# Patient Record
Sex: MALE | Race: BLACK OR AFRICAN AMERICAN | Employment: FULL TIME | URBAN - METROPOLITAN AREA
[De-identification: names, ages, dates, MRNs, and addresses within clinical notes are randomized per-mention and may not be internally consistent; named-entity substitution may affect disease eponyms.]

---

## 2017-03-23 ENCOUNTER — HOSPITAL ENCOUNTER (EMERGENCY)
Age: 27
Discharge: HOME | End: 2017-03-23
Attending: EMERGENCY MEDICINE
Payer: SELF-PAY

## 2017-03-23 VITALS
WEIGHT: 245 LBS | RESPIRATION RATE: 18 BRPM | HEART RATE: 99 BPM | TEMPERATURE: 98.3 F | BODY MASS INDEX: 31.44 KG/M2 | DIASTOLIC BLOOD PRESSURE: 88 MMHG | SYSTOLIC BLOOD PRESSURE: 149 MMHG | HEIGHT: 74 IN | OXYGEN SATURATION: 99 %

## 2017-03-23 DIAGNOSIS — J06.9 ACUTE UPPER RESPIRATORY INFECTION: Primary | ICD-10-CM

## 2017-03-23 PROCEDURE — 99282 EMERGENCY DEPT VISIT SF MDM: CPT

## 2017-03-23 RX ORDER — IBUPROFEN 600 MG/1
600 TABLET ORAL
Qty: 20 TAB | Refills: 0 | Status: SHIPPED | OUTPATIENT
Start: 2017-03-23

## 2017-03-23 NOTE — DISCHARGE INSTRUCTIONS

## 2017-03-23 NOTE — ED NOTES
Emergency Department Nursing Plan of Care       The Nursing Plan of Care is developed from the Nursing assessment and Emergency Department Attending provider initial evaluation. The plan of care may be reviewed in the ED Provider note.     The Plan of Care was developed with the following considerations:   Patient / Family readiness to learn indicated by:verbalized understanding  Persons(s) to be included in education: patient  Barriers to Learning/Limitations:No    Signed     Cecile Elder RN    3/23/2017   11:33 AM

## 2017-03-23 NOTE — ED PROVIDER NOTES
Patient is a 27 y.o. male presenting with nasal congestion and cough. The history is provided by the patient. Nasal Congestion   This is a new problem. Episode onset: Pt reports nasal congestion, productive cough, decreased appetite and mylagias x 5 days. Subjective fever. Denies sore throat, ear pain, sick contacts, N/V. Pt states he is still drinking fluids regularly. Pertinent negatives include no chest pain, no abdominal pain, no headaches and no shortness of breath. Treatments tried: OTC Theraflu. The treatment provided no relief. Cough   This is a new problem. Episode onset: x 5 days. The cough is productive of sputum. Patient reports a subjective fever - was not measured. Pertinent negatives include no chest pain, no chills, no sweats, no eye redness, no ear congestion, no ear pain, no headaches, no rhinorrhea, no sore throat, no myalgias, no shortness of breath, no nausea and no vomiting. His past medical history does not include asthma. No past medical history on file. No past surgical history on file. No family history on file. Social History     Social History    Marital status: N/A     Spouse name: N/A    Number of children: N/A    Years of education: N/A     Occupational History    Not on file. Social History Main Topics    Smoking status: Not on file    Smokeless tobacco: Not on file    Alcohol use Not on file    Drug use: Not on file    Sexual activity: Not on file     Other Topics Concern    Not on file     Social History Narrative         ALLERGIES: Review of patient's allergies indicates no known allergies. Review of Systems   Constitutional: Negative for chills and fever. HENT: Positive for congestion. Negative for ear discharge, ear pain, facial swelling, postnasal drip, rhinorrhea, sore throat and trouble swallowing. Eyes: Negative for photophobia, redness and visual disturbance. Respiratory: Positive for cough.  Negative for chest tightness and shortness of breath. Cardiovascular: Negative for chest pain. Gastrointestinal: Negative for abdominal pain, nausea and vomiting. Genitourinary: Negative for flank pain. Musculoskeletal: Negative for back pain and myalgias. Skin: Negative for color change, pallor, rash and wound. Neurological: Negative for dizziness, weakness, light-headedness and headaches. All other systems reviewed and are negative. Vitals:    03/23/17 1105   BP: 149/88   Pulse: 99   Resp: 18   Temp: 98.3 °F (36.8 °C)   SpO2: 99%   Weight: 111.1 kg (245 lb)   Height: 6' 2\" (1.88 m)            Physical Exam   Constitutional: He is oriented to person, place, and time. He appears well-developed and well-nourished. No distress. HENT:   Head: Normocephalic and atraumatic. Right Ear: Tympanic membrane and external ear normal.   Left Ear: Tympanic membrane, external ear and ear canal normal.   Nose: Mucosal edema present. No rhinorrhea. Right sinus exhibits no maxillary sinus tenderness and no frontal sinus tenderness. Left sinus exhibits no maxillary sinus tenderness and no frontal sinus tenderness. Mouth/Throat: Uvula is midline, oropharynx is clear and moist and mucous membranes are normal. No trismus in the jaw. No uvula swelling. No oropharyngeal exudate, posterior oropharyngeal edema, posterior oropharyngeal erythema or tonsillar abscesses. Eyes: Conjunctivae are normal.   Cardiovascular: Normal rate, regular rhythm and normal heart sounds. Pulmonary/Chest: Effort normal and breath sounds normal. No respiratory distress. Abdominal: Soft. Bowel sounds are normal. There is no tenderness. Musculoskeletal: Normal range of motion. Neurological: He is alert and oriented to person, place, and time. No cranial nerve deficit. Skin: Skin is warm. No rash noted. Psychiatric: He has a normal mood and affect. His behavior is normal.   Nursing note and vitals reviewed.        MDM  Number of Diagnoses or Management Options  Diagnosis management comments: DDx: URI, Tonsillitis/Pharyngitis, Sinusitis    ED Course       Procedures           LABORATORY TESTS:  No results found for this or any previous visit (from the past 12 hour(s)). IMAGING RESULTS:  No orders to display       MEDICATIONS GIVEN:  Medications - No data to display    IMPRESSION:  1. Acute upper respiratory infection        PLAN:  1. Current Discharge Medication List      START taking these medications    Details   ibuprofen (MOTRIN) 600 mg tablet Take 1 Tab by mouth every six (6) hours as needed for Pain. Qty: 20 Tab, Refills: 0      Phenylephrine-DM-guaiFENesin (MUCINEX FAST-MAX CONGEST-COUGH) 2.5-5-100 mg/5 mL liqd Take 10 mg by mouth two (2) times a day. Qty: 118 mL, Refills: 0           2.    Follow-up Information     Follow up With Details Comments 2021 Katiuska Kidd Schedule an appointment as soon as possible for a visit As needed for PCP follow up [address] [facility] [address]  [phone]    [facility] Schedule an appointment as soon as possible for a visit As needed for PCP follow up [address] [facility] [address] [address] [facility]  [phone]        Return to ED if worse

## 2017-03-23 NOTE — LETTER
[facility] EMERGENCY DEPT 
[address] [facility] [address] [address] 
[phone] Work/School Note Date: 3/23/2017 To Whom It May concern: 
 
Alma Jovel was seen and treated today in the emergency room by the following provider(s): 
Attending Provider: Florentino Michel MD 
Physician Assistant: ANGELES Orellana. Alma Jovel may return to work on 3/25/2017. Sincerely, ANGELES Orellana